# Patient Record
Sex: FEMALE | Race: WHITE | Employment: UNEMPLOYED | URBAN - METROPOLITAN AREA
[De-identification: names, ages, dates, MRNs, and addresses within clinical notes are randomized per-mention and may not be internally consistent; named-entity substitution may affect disease eponyms.]

---

## 2020-06-15 ENCOUNTER — VIRTUAL VISIT (OUTPATIENT)
Dept: ENDOCRINOLOGY | Age: 60
End: 2020-06-15
Payer: COMMERCIAL

## 2020-06-15 PROBLEM — Z91.119 DIETARY NONCOMPLIANCE: Status: ACTIVE | Noted: 2020-06-15

## 2020-06-15 PROBLEM — E11.9 DIABETES MELLITUS (HCC): Status: ACTIVE | Noted: 2020-06-15

## 2020-06-15 PROBLEM — E55.9 VITAMIN D DEFICIENCY: Status: ACTIVE | Noted: 2020-06-15

## 2020-06-15 PROBLEM — E78.5 HYPERLIPIDEMIA: Status: ACTIVE | Noted: 2020-06-15

## 2020-06-15 PROCEDURE — 3046F HEMOGLOBIN A1C LEVEL >9.0%: CPT | Performed by: INTERNAL MEDICINE

## 2020-06-15 PROCEDURE — G8427 DOCREV CUR MEDS BY ELIG CLIN: HCPCS | Performed by: INTERNAL MEDICINE

## 2020-06-15 PROCEDURE — 2022F DILAT RTA XM EVC RTNOPTHY: CPT | Performed by: INTERNAL MEDICINE

## 2020-06-15 PROCEDURE — 3017F COLORECTAL CA SCREEN DOC REV: CPT | Performed by: INTERNAL MEDICINE

## 2020-06-15 PROCEDURE — 99204 OFFICE O/P NEW MOD 45 MIN: CPT | Performed by: INTERNAL MEDICINE

## 2020-06-15 PROCEDURE — 4004F PT TOBACCO SCREEN RCVD TLK: CPT | Performed by: INTERNAL MEDICINE

## 2020-06-15 PROCEDURE — G8421 BMI NOT CALCULATED: HCPCS | Performed by: INTERNAL MEDICINE

## 2020-06-15 RX ORDER — LISINOPRIL 5 MG/1
5 TABLET ORAL DAILY
COMMUNITY

## 2020-06-15 RX ORDER — MISOPROSTOL 100 UG/1
100 TABLET ORAL 2 TIMES DAILY
COMMUNITY

## 2020-06-15 RX ORDER — ATORVASTATIN CALCIUM 80 MG/1
80 TABLET, FILM COATED ORAL DAILY
COMMUNITY

## 2020-06-15 RX ORDER — CLOPIDOGREL BISULFATE 75 MG/1
75 TABLET ORAL DAILY
COMMUNITY

## 2020-06-15 RX ORDER — INSULIN DEGLUDEC INJECTION 100 U/ML
54 INJECTION, SOLUTION SUBCUTANEOUS EVERY EVENING
COMMUNITY
End: 2020-06-16 | Stop reason: CLARIF

## 2020-06-15 RX ORDER — PEN NEEDLE, DIABETIC 32GX 5/32"
NEEDLE, DISPOSABLE MISCELLANEOUS
Qty: 250 EACH | Refills: 5 | Status: SHIPPED | OUTPATIENT
Start: 2020-06-15

## 2020-06-15 RX ORDER — INSULIN LISPRO 100 [IU]/ML
INJECTION, SOLUTION INTRAVENOUS; SUBCUTANEOUS 3 TIMES DAILY
COMMUNITY

## 2020-06-15 NOTE — PROGRESS NOTES
[address] [facility] Department of Endocrinology Diabetes and Metabolism   [address] [facility] [address]   Phone: [phone]  Fax: [phone]    Date of Service: 6/15/2020    Primary Care Physician: Patricia Eckert DO  Referring physician: Sejal Merrill DO  Provider: Suri Mensah MD     Reason for the visit:  Poorly controlled DM type 2     History of Present Illness: The history is provided by the patient. No  was used. Accuracy of the patient data is excellent. Timmy Mccrary is a very pleasant 62 y.o. female seen today for diabetes management     Timmy Mccrary was diagnosed with diabetes in 2000   The patient is currently on tresiba 54 units daily at bedtime, Humalog 24 units with meals, Metformin 1000 mg BID, Lexaprol 5 mg daily   The patient has been checking blood sugar once a day in am readings all over the past 200-350   Most recent A1c results summarized below  A1c 13% in 5/2020 Patient has had no hypoglycemic episodes   The patient hasn't been mindful of what has been eating and wasn't following diabetes diet as encouraged   I reviewed current medications and the patient has no issues with diabetes medications  The patient is due for an eye exam. Last eye exam was few years ago   , no h/o diabetic retinopathy  The patient seeing podiatrist every 6 months and also performs her own feet care  Microvascular complications:  No Retinopathy, Nephropathy + Neuropathy   Macrovascular complications: no CAD, + PVD, or Stroke  The patient receives Flushot every year\    PAST MEDICAL HISTORY   Past Medical History:   Diagnosis Date    HLD (hyperlipidemia)     IDDM (insulin dependent diabetes mellitus) ([facility] [address]     Vitamin D deficiency        PAST SURGICAL HISTORY   No past surgical history on file. SOCIAL HISTORY   Tobacco:   has no history on file for tobacco.  Alcohol:   has no history on file for alcohol.   Drugs:   has no history on file for Physical examination:  Due to this being a TeleHealth encounter, evaluation of the following organ systems is limited: Vitals/Constitutional/EENT/Resp/CV/GI//MS/Neuro/Skin/Heme-Lymph-Imm. Modified physical exam through Telemedicine camera    General: Communicating well via camera   Neck: no obvious neck mass. No obvious neck deformity     CVS: no distress   Chest: no distress. Chest is moving with respiration    Extremities:  no visible tremor  Skin: No visible rashes as seen from camera   Musculoskeletal: no visible deformity  Neuro: Alert and oriented to person, place, and time. Psychiatric: Normal mood and affect. Behavior is normal    Review of Laboratory Data:  I personally reviewed the following lab:  No results found for: WBC, RBC, HGB, HCT, MCV, MCH, MCHC, RDW, PLT, MPV, GRANULOCYTES, BANDS   No results found for: NA, K, CO2, BUN, CREATININE, CALCIUM, LABGLOM, GFRAA   No results found for: TSH, T4FREE, A0BWJXW, FT3, W8JSKLY, TSI, TPOABS, THGAB  No results found for: LABA1C, GLUCOSE, MALBCR, LABMICR, LABCREA  No results found for: LABA1C  No results found for: TRIG, HDL, LDLCALC, CHOL  No results found for: VITD25    Medical Records/Labs/Images review:   I personally reviewed and summarized previous records   All labs and imaging studies were independently reviewed     [address], a 62 y.o.-old female seen in for the following issues     Diabetes Mellitus Type 2     · Patient's diabetes is uncontrol, A1c was 13% few weeks ago    · Will change DM regimen to Lantus 54 units daily, Humalog 20 units with meals + ss 3:50>150   · The patient was advised to check blood sugars 4 times a day before meals and at bedtime and send BS readings to our office in a week.   · Discussed with patient A1c and blood sugar goals   · The patient counseled about the complications of uncontrolled diabetes   · Patient was counselled about the importance of self-blood glucose monitoring

## 2020-06-15 NOTE — PROGRESS NOTES
Felibertokris Quezada was read the following message We want to confirm that, for purposes of billing, this is a virtual visit with your provider for which we will submit a claim for reimbursement with your insurance company. You will be responsible for any copays, coinsurance amounts or other amounts not covered by your insurance company. If you do not accept this, unfortunately we will not be able to schedule a virtual visit with the provider. Do you accept?  Kingsley Gomez responded YES

## 2020-06-16 RX ORDER — INSULIN GLARGINE 100 [IU]/ML
INJECTION, SOLUTION SUBCUTANEOUS
Qty: 20 PEN | Refills: 2 | Status: SHIPPED | OUTPATIENT
Start: 2020-06-16

## 2020-07-01 ENCOUNTER — HOSPITAL ENCOUNTER (OUTPATIENT)
Dept: DIABETES SERVICES | Age: 60
Setting detail: THERAPIES SERIES
Discharge: HOME | End: 2020-07-01
Payer: COMMERCIAL

## 2020-07-01 VITALS — TEMPERATURE: 97.3 F | BODY MASS INDEX: 27.68 KG/M2 | HEIGHT: 60 IN | WEIGHT: 141 LBS

## 2020-07-01 PROCEDURE — G0108 DIAB MANAGE TRN  PER INDIV: HCPCS | Performed by: DIETITIAN, REGISTERED

## 2020-07-01 SDOH — ECONOMIC STABILITY: FOOD INSECURITY: ADDITIONAL INFORMATION: NO

## 2020-07-01 ASSESSMENT — PATIENT HEALTH QUESTIONNAIRE - PHQ9
SUM OF ALL RESPONSES TO PHQ9 QUESTIONS 1 & 2: 3
SUM OF ALL RESPONSES TO PHQ QUESTIONS 1-9: 10
2. FEELING DOWN, DEPRESSED OR HOPELESS: 0
1. LITTLE INTEREST OR PLEASURE IN DOING THINGS: 3
SUM OF ALL RESPONSES TO PHQ QUESTIONS 1-9: 10

## 2020-07-01 NOTE — LETTER
[facility])- Diabetes Education    2020       Re:     Kemi Medel         :  1960  Dear Dr. Domitila Georges; Thank you for referring your patient, Kemi Medel, for diabetes education sessions. Your patient has completed their personalized initial comprehensive education plan. Your patient attended 1:1 DSMES on 2020 that addressed the following topics:    Nursing/Medical [x]      Nutrition [x]  [x] Diabetes disease process & treatment   [x] Using medications safely  [x] Monitoring blood glucose/interpreting results  [x] Prevention, detection and treatment of acute complications  [x] Prevention, detection and treatment of chronic complications  [] Developing strategies to address psychosocial issues    [x] Nutritional management: basic principles   [x] Carbohydrate counting, plate method, label reading  [] Incorporating physical activity into diabetes lifestyle  [] Problem solving and preparing for crisis situations  [] Diabetes supply management  [x] Goal setting and behavior changes       In addition, these other services were provided:    [x] Diet instruction on carbohydrate consistent meal plan with  1300 calories, and the following number of carbohydrate servings per meal or snack (15 gm/serving):  Breakfast:  3,    Lunch: 3,   Dinner:  4    AM Snack:  0,    PM Snack:  0,   HS Snack:  0    PHQ-9 Depression Screen Score: 10  0-4: Minimal Depression                5-9: Mild Depression    10-14: Moderate Depression  15-19:  Moderately Severe Depression  20-27: Severe Depression     COMMENTS:      PATIENT SELECTED GOAL:   [x]  To follow individual meal plan/Diabetes Plate Method  []  To take medication as prescribed  []  To increase exercise by  []  Other:       DIABETES SELF-MANAGEMENT SUPPORT PLAN/REFERRALS (patient identified):  [x] Keep my scheduled visits with my doctor   [] Make and keep appointments with specialists (foot, eye, dentist) as recommended [] Consult my pharmacist with all new medications and/or any medication questions  [] Get tested for sleep apnea  [] Seek help for:   [] Make an appointment with:   [] Attend smoking cessation classes or call help-line (7-605-KMRM-NOW; [phone])  [] Attend a diabetes support group  [] Use diabetes magazines, books, or the American Diabetes Association website (www.diabetes. org) for more information    [] Start or increase exercising at home or join a program:  [] Other: There will be a follow-up in 3 months to evaluate the attainment of their chosen goal, and self identified support plan and you will be notified of their progress. Thank you for referring this patient to our program.  Please do not hesitate to call if you have any questions at  [facility] or [facility]) or [phone] ([address] [place] [address]).         Sincerely,    Diabetes Educators:     []  EULA Blanco      []  Kathe Johns RN BSN HEENA     [x]  JOI Walton         []  Kedar Padgett, MS JOI IBRAHIM   []  Raquel IBRAHIM  []  JOI Gimenez        Diabetes

## 2020-07-01 NOTE — PROGRESS NOTES
levels;  -List diabetes medication taken, action & side effects 2 7/1/2020-JA  3 humalog with each meal  Currently finishing tresiba but will start basaglar, currently 54 units nightly  Metformin 1000 mg BID  Pt admits she sometimes forgets her diabetes medications   Insulin/Injectables  -Name appropriate injection sites; proper storage; supplies needed;  N/A        Demonstrate proper technique        Monitoring blood glucose, interpreting and using results:   -Identify recommended & personal blood glucose targets & HgbA1C target levels  -State the Importance of logging blood glucose levels for pattern recognition;   -State benefits of reading/using pt generated health data  -Verbalize safe lancet disposal 2 7/1/2020-JA  3 Pt monitoring 4 times daily, provided blood sugar record sheet to take readings to physician appointment   -Demonstrate proper testing technique        Incorporating physical activity into lifestyle:   -State effect of exercise on blood glucose levels;   -State benefits of regular exercise;   -Define safety considerations;  -Describe contraindications/maintenance of activity. 1 7/1/2020-JA  3 Pt has limited activity d/t back pain   Incorporating nutritional management into lifestyle:   -Describe effect of type, amount & timing of food on blood glucose  -Describe methods for preparing and planning healthy meals  Correctly read food labels 1 7/1/2020-JA  3 Instructed on 1300 calorie meal plan, 3 servings carbohydrate at breakfast and lunch and 4 at dinner. 6 oz protein, 3 fat servings daily. Provided sample meals and snacks. Pt admits meals are irregular, skips meals   Plan personal carbohydrate-controlled meal based on individualized meal plan  Demonstrate CHO counting/portion control         Developing strategies for problem solving to promote health/change behavior. -Identify 7 self-care behaviors; Personal health risk factors;  Benefits, challenges & strategies for behavioral change and set an

## 2020-10-19 ENCOUNTER — FOLLOWUP TELEPHONE ENCOUNTER (OUTPATIENT)
Dept: DIABETES SERVICES | Age: 60
End: 2020-10-19

## 2020-10-19 NOTE — PROGRESS NOTES
Diabetes Self-Management Education Record    Participant Name: Darlene Taylor  Referring Provider: Silviano Light DO  Assessment/Evaluation Ratings:  1=Needs Instruction   4=Demonstrates Understanding/Competency  2=Needs Review   NC=Not Covered    3=Comprehends Key Points  N/A=Not Applicable  Topics/Learning Objectives Pre-session Assess Date:  7/1/2020MICAH Instr. Date Follow-up Date Post- session Eval Comments   Diabetes disease process & Treatment process:   -Define diabetes & prediabetes and ABCs of     diabetes   -Identify own type of diabetes  -Identify lifestyle changes/treatment options 1 7/1/2020MICAH  3 Diagnosed 20 years ago, type 2, no previous diabetes education   Developing strategies for Healthy coping/psychosocial issues:    -Describe feelings about living with diabetes  -Identify coping strategies;   -Identify support needed & support network available 1 7/1/2020-SUNI  3       PHQ-2 Depression Screen Score: 10    PHQ-9 Score:   []Physician notified of suicidal ideations   Prevention, detection & treatment of Chronic complications:    -Identify the prevention, detection and treatment for complications including immunizations, preventive eye, foot, dental and renal exams as indicated per the participant's duration of diabetes and health status.  -Define the natural course of diabetes and the relationship of blood glucose levels to long term complications of diabetes.   1 7/1/2020MICAH  3 Discussed importance of smoking cessation, risk to vision   Prevention, detection & treatment of acute complications:    -List symptoms of hyper & hypoglycemia, and prevention & treatment strategies.   -Describe sick day guidelines  DKA /indications for ketone testing &  when to call physician  1 7/1/2020MICAH  3 Discussed S/S of hypoglycemia and ways to treat   Identify severe weather/situation crisis  & diabetes supplies management        Using medications safely:   -State effects of diabetes medicines on blood glucose levels;  -List diabetes medication taken, action & side effects 2 7/1/2020-JA  3 humalog with each meal  Currently finishing tresiba but will start basaglar, currently 54 units nightly  Metformin 1000 mg BID  Pt admits she sometimes forgets her diabetes medications   Insulin/Injectables  -Name appropriate injection sites; proper storage; supplies needed;  N/A        Demonstrate proper technique        Monitoring blood glucose, interpreting and using results:   -Identify recommended & personal blood glucose targets & HgbA1C target levels  -State the Importance of logging blood glucose levels for pattern recognition;   -State benefits of reading/using pt generated health data  -Verbalize safe lancet disposal 2 7/1/2020-JA  3 Pt monitoring 4 times daily, provided blood sugar record sheet to take readings to physician appointment   -Demonstrate proper testing technique        Incorporating physical activity into lifestyle:   -State effect of exercise on blood glucose levels;   -State benefits of regular exercise;   -Define safety considerations;  -Describe contraindications/maintenance of activity. 1 7/1/2020-JA  3 Pt has limited activity d/t back pain   Incorporating nutritional management into lifestyle:   -Describe effect of type, amount & timing of food on blood glucose  -Describe methods for preparing and planning healthy meals  Correctly read food labels 1 7/1/2020-JA  3 Instructed on 1300 calorie meal plan, 3 servings carbohydrate at breakfast and lunch and 4 at dinner. 6 oz protein, 3 fat servings daily. Provided sample meals and snacks. Pt admits meals are irregular, skips meals   Plan personal carbohydrate-controlled meal based on individualized meal plan  Demonstrate CHO counting/portion control         Developing strategies for problem solving to promote health/change behavior. -Identify 7 self-care behaviors; Personal health risk factors;  Benefits, challenges & strategies for behavioral change and set an individualized goal selection.  1 7/1/2020-JA  3 Goal: consistent carbohydrate at each meal and snack     Identified Barriers to learning/adherence to self management plan:    None  []  other    Instruction Method:  Lecture/Discussion and Handouts    Supporting Education Materials/Equipment Provided: Meal Plan and Nutritional Packet   []Setswana materials       [] services     []Other:      Encounter Type Date Attended Start Time End Time Comments No Show Dates   Assessment 7/1/2020-SUNI 8:15 8:45       Session 1         Session 2 7/1/2020-SUNI 8:45 9:15     Session 3         In person Follow-up         Gestational Diabetes         Individual MNT        Meter Instrx        Insulin Instrx           Additional Comments:Pt was attended 1:1 DSMES d/t no class available d/t covid 19      Date:           DSMES Follow-up plan based on patients DSMES goal    Dr Notified by [] EMR []Fax        [x]HgbA1C   []Weight   []Hypertension  []Follow-up with Physician  []Medication compliance   []Plate method/meal plan compliance   []Self-Foot Exam Frequency   []Monitoring Frequency   []Exercise Routine   []Goal Attainment       [x]Patient lost to follow-up  Dr Notified by [x]EMR []Fax     Personal Support Plan:      [x] Keep all scheduled doctor appointments   [] Make and keep appointments with specialists (foot, eye, dentist) as recommended   [] Consult my pharmacist about all new medications or to ask any medication questions   [] Get tested for sleep apnea   [] Seek help for:   [] Make an appointment with:   [] Attend smoking cessation classes or call 8-800-QUIT-NOW  [] Attend Diabetes Support Group   [] Use diabetes magazines, books, or credible web-sites like the ADA for more information  [] Increase exercise at home or join an exercise program:   [] Other: